# Patient Record
Sex: FEMALE | Race: WHITE
[De-identification: names, ages, dates, MRNs, and addresses within clinical notes are randomized per-mention and may not be internally consistent; named-entity substitution may affect disease eponyms.]

---

## 2020-10-08 ENCOUNTER — HOSPITAL ENCOUNTER (OUTPATIENT)
Dept: HOSPITAL 95 - PLD | Age: 75
Discharge: HOME | End: 2020-10-08
Attending: DERMATOLOGY
Payer: COMMERCIAL

## 2020-10-08 DIAGNOSIS — D48.5: Primary | ICD-10-CM

## 2021-05-28 ENCOUNTER — HOSPITAL ENCOUNTER (OUTPATIENT)
Dept: HOSPITAL 95 - ORSCSDS | Age: 76
Discharge: HOME | End: 2021-05-28
Attending: INTERNAL MEDICINE
Payer: COMMERCIAL

## 2021-05-28 VITALS — BODY MASS INDEX: 37.52 KG/M2 | HEIGHT: 64 IN | WEIGHT: 219.8 LBS

## 2021-05-28 DIAGNOSIS — D12.5: ICD-10-CM

## 2021-05-28 DIAGNOSIS — E66.9: ICD-10-CM

## 2021-05-28 DIAGNOSIS — Z79.84: ICD-10-CM

## 2021-05-28 DIAGNOSIS — K57.30: ICD-10-CM

## 2021-05-28 DIAGNOSIS — Z12.11: Primary | ICD-10-CM

## 2021-05-28 DIAGNOSIS — Z79.899: ICD-10-CM

## 2021-05-28 DIAGNOSIS — D12.3: ICD-10-CM

## 2021-05-28 DIAGNOSIS — E11.9: ICD-10-CM

## 2021-05-28 DIAGNOSIS — Z79.82: ICD-10-CM

## 2021-05-28 DIAGNOSIS — G47.33: ICD-10-CM

## 2021-05-28 DIAGNOSIS — Z80.0: ICD-10-CM

## 2021-05-28 DIAGNOSIS — I10: ICD-10-CM

## 2021-05-28 DIAGNOSIS — D12.2: ICD-10-CM

## 2021-07-22 ENCOUNTER — HOSPITAL ENCOUNTER (OUTPATIENT)
Dept: HOSPITAL 95 - ORSCSDS | Age: 76
Discharge: HOME | End: 2021-07-22
Attending: OPHTHALMOLOGY
Payer: COMMERCIAL

## 2021-07-22 VITALS — WEIGHT: 219.8 LBS | BODY MASS INDEX: 37.52 KG/M2 | HEIGHT: 64 IN

## 2021-07-22 DIAGNOSIS — I10: ICD-10-CM

## 2021-07-22 DIAGNOSIS — E66.01: ICD-10-CM

## 2021-07-22 DIAGNOSIS — Z79.84: ICD-10-CM

## 2021-07-22 DIAGNOSIS — E78.00: ICD-10-CM

## 2021-07-22 DIAGNOSIS — Z79.82: ICD-10-CM

## 2021-07-22 DIAGNOSIS — H25.11: Primary | ICD-10-CM

## 2021-07-22 DIAGNOSIS — G47.33: ICD-10-CM

## 2021-07-22 DIAGNOSIS — E11.9: ICD-10-CM

## 2021-07-22 DIAGNOSIS — Z79.899: ICD-10-CM

## 2021-07-22 PROCEDURE — 08RJ3JZ REPLACEMENT OF RIGHT LENS WITH SYNTHETIC SUBSTITUTE, PERCUTANEOUS APPROACH: ICD-10-PCS | Performed by: OPHTHALMOLOGY

## 2021-07-22 PROCEDURE — V2632 POST CHMBR INTRAOCULAR LENS: HCPCS

## 2021-07-22 NOTE — NUR
07/22/21 0912 Cristina MitchellBrittni
0858 TETRACAINE DROP PLACED IN RT EYE PER DR ORDER.
0859 PLEDGET PLACED IN RT EYE PER DR ORDER.

## 2022-09-14 ENCOUNTER — HOSPITAL ENCOUNTER (OUTPATIENT)
Dept: HOSPITAL 95 - LAB SHORT | Age: 77
Discharge: HOME | End: 2022-09-14
Attending: FAMILY MEDICINE
Payer: COMMERCIAL

## 2022-09-14 DIAGNOSIS — M79.671: Primary | ICD-10-CM

## 2022-09-14 LAB
ANION GAP SERPL CALCULATED.4IONS-SCNC: 8 MMOL/L (ref 6–16)
BASOPHILS # BLD AUTO: 0.08 K/MM3 (ref 0–0.23)
BASOPHILS NFR BLD AUTO: 1 % (ref 0–2)
BUN SERPL-MCNC: 28 MG/DL (ref 8–24)
CALCIUM SERPL-MCNC: 9.9 MG/DL (ref 8.5–10.1)
CHLORIDE SERPL-SCNC: 105 MMOL/L (ref 98–108)
CO2 SERPL-SCNC: 27 MMOL/L (ref 21–32)
CREAT SERPL-MCNC: 1.08 MG/DL (ref 0.4–1)
DEPRECATED RDW RBC AUTO: 41.6 FL (ref 35.1–46.3)
EOSINOPHIL # BLD AUTO: 0.11 K/MM3 (ref 0–0.68)
EOSINOPHIL NFR BLD AUTO: 1 % (ref 0–6)
ERYTHROCYTE [DISTWIDTH] IN BLOOD BY AUTOMATED COUNT: 12.9 % (ref 11.7–14.2)
GLUCOSE SERPL-MCNC: 154 MG/DL (ref 70–99)
HCT VFR BLD AUTO: 36.4 % (ref 33–51)
HGB BLD-MCNC: 12.4 G/DL (ref 11.5–16)
IMM GRANULOCYTES # BLD AUTO: 0.02 K/MM3 (ref 0–0.1)
IMM GRANULOCYTES NFR BLD AUTO: 0 % (ref 0–1)
LYMPHOCYTES # BLD AUTO: 3.29 K/MM3 (ref 0.84–5.2)
LYMPHOCYTES NFR BLD AUTO: 39 % (ref 21–46)
MCHC RBC AUTO-ENTMCNC: 34.1 G/DL (ref 31.5–36.5)
MCV RBC AUTO: 89 FL (ref 80–100)
MONOCYTES # BLD AUTO: 0.55 K/MM3 (ref 0.16–1.47)
MONOCYTES NFR BLD AUTO: 7 % (ref 4–13)
NEUTROPHILS # BLD AUTO: 4.36 K/MM3 (ref 1.96–9.15)
NEUTROPHILS NFR BLD AUTO: 52 % (ref 41–73)
NRBC # BLD AUTO: 0 K/MM3 (ref 0–0.02)
NRBC BLD AUTO-RTO: 0 /100 WBC (ref 0–0.2)
PLATELET # BLD AUTO: 278 K/MM3 (ref 150–400)
POTASSIUM SERPL-SCNC: 4.2 MMOL/L (ref 3.5–5.5)
SODIUM SERPL-SCNC: 140 MMOL/L (ref 136–145)
URATE SERPL-MCNC: 7.3 MG/DL (ref 2.6–6)

## 2025-05-14 ENCOUNTER — HOSPITAL ENCOUNTER (OUTPATIENT)
Dept: HOSPITAL 95 - ORSCMMR | Age: 80
Discharge: HOME | End: 2025-05-14
Attending: INTERNAL MEDICINE
Payer: COMMERCIAL

## 2025-05-14 VITALS — DIASTOLIC BLOOD PRESSURE: 59 MMHG | SYSTOLIC BLOOD PRESSURE: 130 MMHG

## 2025-05-14 VITALS — DIASTOLIC BLOOD PRESSURE: 56 MMHG | SYSTOLIC BLOOD PRESSURE: 118 MMHG

## 2025-05-14 VITALS — DIASTOLIC BLOOD PRESSURE: 76 MMHG | SYSTOLIC BLOOD PRESSURE: 151 MMHG

## 2025-05-14 DIAGNOSIS — E11.9: ICD-10-CM

## 2025-05-14 DIAGNOSIS — Z79.82: ICD-10-CM

## 2025-05-14 DIAGNOSIS — K21.9: ICD-10-CM

## 2025-05-14 DIAGNOSIS — E78.00: ICD-10-CM

## 2025-05-14 DIAGNOSIS — G47.33: ICD-10-CM

## 2025-05-14 DIAGNOSIS — Z79.84: ICD-10-CM

## 2025-05-14 DIAGNOSIS — I10: ICD-10-CM

## 2025-05-14 DIAGNOSIS — K63.5: ICD-10-CM

## 2025-05-14 DIAGNOSIS — Z12.11: Primary | ICD-10-CM

## 2025-05-14 DIAGNOSIS — Z79.899: ICD-10-CM

## 2025-05-14 DIAGNOSIS — Z86.0100: ICD-10-CM

## 2025-05-14 PROCEDURE — 0DBN8ZX EXCISION OF SIGMOID COLON, VIA NATURAL OR ARTIFICIAL OPENING ENDOSCOPIC, DIAGNOSTIC: ICD-10-PCS | Performed by: INTERNAL MEDICINE
